# Patient Record
Sex: MALE | Race: WHITE | NOT HISPANIC OR LATINO | Employment: FULL TIME | URBAN - NONMETROPOLITAN AREA
[De-identification: names, ages, dates, MRNs, and addresses within clinical notes are randomized per-mention and may not be internally consistent; named-entity substitution may affect disease eponyms.]

---

## 2022-12-17 ENCOUNTER — HOSPITAL ENCOUNTER (EMERGENCY)
Facility: HOSPITAL | Age: 47
Discharge: HOME | End: 2022-12-17
Attending: EMERGENCY MEDICINE

## 2022-12-17 ENCOUNTER — APPOINTMENT (EMERGENCY)
Dept: CT IMAGING | Facility: HOSPITAL | Age: 47
End: 2022-12-17

## 2022-12-17 ENCOUNTER — APPOINTMENT (EMERGENCY)
Dept: RADIOLOGY | Facility: HOSPITAL | Age: 47
End: 2022-12-17

## 2022-12-17 VITALS
OXYGEN SATURATION: 95 % | TEMPERATURE: 98.4 F | RESPIRATION RATE: 18 BRPM | SYSTOLIC BLOOD PRESSURE: 151 MMHG | BODY MASS INDEX: 35.21 KG/M2 | HEIGHT: 72 IN | DIASTOLIC BLOOD PRESSURE: 99 MMHG | HEART RATE: 96 BPM | WEIGHT: 260 LBS

## 2022-12-17 DIAGNOSIS — R06.02 SOB (SHORTNESS OF BREATH): Primary | ICD-10-CM

## 2022-12-17 DIAGNOSIS — U09.9 POST-COVID-19 SYNDROME: ICD-10-CM

## 2022-12-17 LAB
2HR DELTA HS TROPONIN: -1 NG/L
ALBUMIN SERPL BCP-MCNC: 3.7 G/DL (ref 3.5–5)
ALP SERPL-CCNC: 66 U/L (ref 46–116)
ALT SERPL W P-5'-P-CCNC: 26 U/L (ref 12–78)
ANION GAP SERPL CALCULATED.3IONS-SCNC: 9 MMOL/L (ref 4–13)
AST SERPL W P-5'-P-CCNC: 13 U/L (ref 5–45)
BASOPHILS # BLD AUTO: 0.02 THOUSANDS/ÂΜL (ref 0–0.1)
BASOPHILS NFR BLD AUTO: 0 % (ref 0–1)
BILIRUB SERPL-MCNC: 0.52 MG/DL (ref 0.2–1)
BUN SERPL-MCNC: 13 MG/DL (ref 5–25)
CALCIUM SERPL-MCNC: 8.8 MG/DL (ref 8.3–10.1)
CARDIAC TROPONIN I PNL SERPL HS: 22 NG/L
CARDIAC TROPONIN I PNL SERPL HS: 23 NG/L
CHLORIDE SERPL-SCNC: 105 MMOL/L (ref 96–108)
CK MB SERPL-MCNC: 1.6 % (ref 0–2.5)
CK MB SERPL-MCNC: 2.6 NG/ML (ref 0–5)
CK SERPL-CCNC: 162 U/L (ref 39–308)
CO2 SERPL-SCNC: 27 MMOL/L (ref 21–32)
CREAT SERPL-MCNC: 1.01 MG/DL (ref 0.6–1.3)
CRP SERPL QL: 9.5 MG/L
D DIMER PPP FEU-MCNC: 1.97 UG/ML FEU
EOSINOPHIL # BLD AUTO: 0.07 THOUSAND/ÂΜL (ref 0–0.61)
EOSINOPHIL NFR BLD AUTO: 1 % (ref 0–6)
ERYTHROCYTE [DISTWIDTH] IN BLOOD BY AUTOMATED COUNT: 13.5 % (ref 11.6–15.1)
FLUAV RNA RESP QL NAA+PROBE: NEGATIVE
FLUBV RNA RESP QL NAA+PROBE: NEGATIVE
GFR SERPL CREATININE-BSD FRML MDRD: 88 ML/MIN/1.73SQ M
GLUCOSE SERPL-MCNC: 113 MG/DL (ref 65–140)
HCT VFR BLD AUTO: 44.6 % (ref 36.5–49.3)
HGB BLD-MCNC: 15.1 G/DL (ref 12–17)
IMM GRANULOCYTES # BLD AUTO: 0.07 THOUSAND/UL (ref 0–0.2)
IMM GRANULOCYTES NFR BLD AUTO: 1 % (ref 0–2)
INR PPP: 0.97 (ref 0.84–1.19)
LYMPHOCYTES # BLD AUTO: 2.11 THOUSANDS/ÂΜL (ref 0.6–4.47)
LYMPHOCYTES NFR BLD AUTO: 20 % (ref 14–44)
MAGNESIUM SERPL-MCNC: 2.2 MG/DL (ref 1.6–2.6)
MCH RBC QN AUTO: 29.2 PG (ref 26.8–34.3)
MCHC RBC AUTO-ENTMCNC: 33.9 G/DL (ref 31.4–37.4)
MCV RBC AUTO: 86 FL (ref 82–98)
MONOCYTES # BLD AUTO: 0.8 THOUSAND/ÂΜL (ref 0.17–1.22)
MONOCYTES NFR BLD AUTO: 7 % (ref 4–12)
NEUTROPHILS # BLD AUTO: 7.67 THOUSANDS/ÂΜL (ref 1.85–7.62)
NEUTS SEG NFR BLD AUTO: 71 % (ref 43–75)
NRBC BLD AUTO-RTO: 0 /100 WBCS
NT-PROBNP SERPL-MCNC: 864 PG/ML
PLATELET # BLD AUTO: 224 THOUSANDS/UL (ref 149–390)
PMV BLD AUTO: 11.8 FL (ref 8.9–12.7)
POTASSIUM SERPL-SCNC: 3.3 MMOL/L (ref 3.5–5.3)
PROT SERPL-MCNC: 6.6 G/DL (ref 6.4–8.4)
PROTHROMBIN TIME: 13 SECONDS (ref 11.6–14.5)
RBC # BLD AUTO: 5.18 MILLION/UL (ref 3.88–5.62)
RSV RNA RESP QL NAA+PROBE: NEGATIVE
SARS-COV-2 RNA RESP QL NAA+PROBE: NEGATIVE
SODIUM SERPL-SCNC: 141 MMOL/L (ref 135–147)
WBC # BLD AUTO: 10.74 THOUSAND/UL (ref 4.31–10.16)

## 2022-12-17 RX ORDER — ALLOPURINOL 100 MG/1
100 TABLET ORAL DAILY
COMMUNITY

## 2022-12-17 RX ADMIN — IOHEXOL 98 ML: 350 INJECTION, SOLUTION INTRAVENOUS at 19:19

## 2022-12-17 NOTE — ED PROVIDER NOTES
History  Chief Complaint   Patient presents with   • Shortness of Breath     Sent in by  to rule out pneumonia  Pt had episode of spitting up blood     Patient is a 60-year-old male presenting to the emergency department complaining of cough, congestion, chest tightness and shortness of breath, symptoms been going on for the past few days, he did test positive for COVID-19 on November 29, he took OTC medications for symptom control, he is a former smoker having quit 3 months ago, he continues to have a productive cough, he gets some dyspnea on exertion as well, this is the first episode of COVID that patient has had that he is aware of, he has not been vaccinated for COVID previously          Prior to Admission Medications   Prescriptions Last Dose Informant Patient Reported? Taking?   allopurinol (ZYLOPRIM) 100 mg tablet   Yes Yes   Sig: Take 100 mg by mouth daily   metFORMIN (GLUCOPHAGE) 1000 MG tablet   Yes Yes   Sig: Take 1,000 mg by mouth 2 (two) times a day with meals      Facility-Administered Medications: None       Past Medical History:   Diagnosis Date   • [facility]     boardline       History reviewed  No pertinent surgical history  History reviewed  No pertinent family history  I have reviewed and agree with the history as documented  E-Cigarette/Vaping   • E-Cigarette Use Never User      E-Cigarette/Vaping Substances     Social History     Tobacco Use   • Smoking status: Former     Types: Cigarettes   • Smokeless tobacco: Former   Vaping Use   • Vaping Use: Never used   Substance Use Topics   • Drug use: Not Currently       Review of Systems   Constitutional: Negative  HENT: Positive for congestion  Eyes: Negative  Respiratory: Positive for cough, chest tightness and shortness of breath  Cardiovascular: Negative  Gastrointestinal: Negative  Endocrine: Negative  Genitourinary: Negative  Musculoskeletal: Negative  Skin: Negative      Allergic/Immunologic: Negative  Neurological: Negative  Hematological: Negative  Psychiatric/Behavioral: Negative  Physical Exam  Physical Exam  Constitutional:       Appearance: He is well-developed  HENT:      Head: Normocephalic and atraumatic  Mouth/Throat:      Mouth: Mucous membranes are moist    Eyes:      Conjunctiva/sclera: Conjunctivae normal       Pupils: Pupils are equal, round, and reactive to light  Cardiovascular:      Rate and Rhythm: Tachycardia present  Pulmonary:      Effort: Pulmonary effort is normal       Breath sounds: Normal breath sounds  Abdominal:      Palpations: Abdomen is soft  Musculoskeletal:         General: Normal range of motion  Cervical back: Normal range of motion and neck supple  Skin:     General: Skin is warm and dry  Neurological:      Mental Status: He is alert and oriented to person, place, and time           Vital Signs  ED Triage Vitals   Temperature Pulse Respirations Blood Pressure SpO2   12/17/22 1656 12/17/22 1656 12/17/22 1656 12/17/22 1656 12/17/22 1656   (!) 96 6 °F (35 9 °C) (!) 116 16 (!) 194/130 95 %      Temp Source Heart Rate Source Patient Position - Orthostatic VS BP Location FiO2 (%)   12/17/22 1900 12/17/22 1900 -- 12/17/22 1656 --   Oral Monitor  Left arm       Pain Score       12/17/22 1656       No Pain           Vitals:    12/17/22 1656 12/17/22 1900   BP: (!) 194/130 (!) 163/102   Pulse: (!) 116 91         Visual Acuity      ED Medications  Medications   iohexol (OMNIPAQUE) 350 MG/ML injection (SINGLE-DOSE) 98 mL (98 mL Intravenous Given 12/17/22 1919)       Diagnostic Studies  Results Reviewed     Procedure Component Value Units Date/Time    HS Troponin I 2hr [[id number]]  (Normal) Collected: 12/17/22 1854    Lab Status: Final result Specimen: Blood from Arm, Left Updated: 12/17/22 1950     hs TnI 2hr 22 ng/L      Delta 2hr hsTnI -1 ng/L     HS Troponin I 4hr [[id number]]     Lab Status: No result Specimen: Blood     FLU/RSV/COVID - if FLU/RSV clinically relevant [[id number]]  (Normal) Collected: 12/17/22 1732    Lab Status: Final result Specimen: Nares from Nasopharyngeal Swab Updated: 12/17/22 1832     SARS-CoV-2 Negative     INFLUENZA A PCR Negative     INFLUENZA B PCR Negative     RSV PCR Negative    Narrative:      FOR PEDIATRIC PATIENTS - copy/paste COVID Guidelines URL to browser: [email or web address]    SARS-CoV-2 assay is a Nucleic Acid Amplification assay intended for the  qualitative detection of nucleic acid from SARS-CoV-2 in nasopharyngeal  swabs  Results are for the presumptive identification of SARS-CoV-2 RNA  Positive results are indicative of infection with SARS-CoV-2, the virus  causing COVID-19, but do not rule out bacterial infection or co-infection  with other viruses  Laboratories within the [place] and its  territories are required to report all positive results to the appropriate  public health authorities  Negative results do not preclude SARS-CoV-2  infection and should not be used as the sole basis for treatment or other  patient management decisions  Negative results must be combined with  clinical observations, patient history, and epidemiological information  This test has not been FDA cleared or approved  This test has been authorized by FDA under an Emergency Use Authorization  (EUA)  This test is only authorized for the duration of time the  declaration that circumstances exist justifying the authorization of the  emergency use of an in vitro diagnostic tests for detection of SARS-CoV-2  virus and/or diagnosis of COVID-19 infection under section 564(b)(1) of  the Act, 21 U  [place] C  541PKW-3(M)(7), unless the authorization is terminated  or revoked sooner  The test has been validated but independent review by FDA  and CLIA is pending  Test performed using [employer]: This RT-PCR assay targets N2,  a region unique to SARS-CoV-2   A conserved region in the E-gene was chosen  for pan-Sarbecovirus detection which includes SARS-CoV-2  According to CMS-2020-01-R, this platform meets the definition of high-throughput technology      Comprehensive metabolic panel [[id number]]  (Abnormal) Collected: 12/17/22 1732    Lab Status: Final result Specimen: Blood from Arm, Right Updated: 12/17/22 1823     Sodium 141 mmol/L      Potassium 3 3 mmol/L      Chloride 105 mmol/L      CO2 27 mmol/L      ANION GAP 9 mmol/L      BUN 13 mg/dL      Creatinine 1 01 mg/dL      Glucose 113 mg/dL      Calcium 8 8 mg/dL      AST 13 U/L      ALT 26 U/L      Alkaline Phosphatase 66 U/L      Total Protein 6 6 g/dL      Albumin 3 7 g/dL      Total Bilirubin 0 52 mg/dL      eGFR 88 ml/min/1 73sq m     Narrative:      [place] guidelines for Chronic Kidney Disease (CKD):   •  Stage 1 with normal or high GFR (GFR > 90 mL/min/1 73 square meters)  •  Stage 2 Mild CKD (GFR = 60-89 mL/min/1 73 square meters)  •  Stage 3A Moderate CKD (GFR = 45-59 mL/min/1 73 square meters)  •  Stage 3B Moderate CKD (GFR = 30-44 mL/min/1 73 square meters)  •  Stage 4 Severe CKD (GFR = 15-29 mL/min/1 73 square meters)  •  Stage 5 End Stage CKD (GFR <15 mL/min/1 73 square meters)  Note: GFR calculation is accurate only with a steady state creatinine    Magnesium [[id number]]  (Normal) Collected: 12/17/22 1732    Lab Status: Final result Specimen: Blood from Arm, Right Updated: 12/17/22 1823     Magnesium 2 2 mg/dL     C-reactive protein [[id number]]  (Abnormal) Collected: 12/17/22 1732    Lab Status: Final result Specimen: Blood from Arm, Right Updated: 12/17/22 1823     CRP 9 5 mg/L     NT-BNP PRO [[id number]]  (Abnormal) Collected: 12/17/22 1732    Lab Status: Final result Specimen: Blood from Arm, Right Updated: 12/17/22 1823     NT-proBNP 864 pg/mL     D-dimer, quantitative [[id number]]  (Abnormal) Collected: 12/17/22 1732    Lab Status: Final result Specimen: Blood from Arm, Right Updated: 12/17/22 1822     D-Dimer, Quant 1 97 ug/ml FEU     CK (with reflex to MB) [[id number]]  (Normal) Collected: 12/17/22 1732    Lab Status: Final result Specimen: Blood from Arm, Right Updated: 12/17/22 1821     Total  U/L     CKMB [[id number]] Collected: 12/17/22 1732    Lab Status: In process Specimen: Blood from Arm, Right Updated: 12/17/22 1821    HS Troponin 0hr (reflex protocol) [[id number]]  (Normal) Collected: 12/17/22 1732    Lab Status: Final result Specimen: Blood from Arm, Right Updated: 12/17/22 1820     hs TnI 0hr 23 ng/L     Protime-INR [[id number]]  (Normal) Collected: 12/17/22 1732    Lab Status: Final result Specimen: Blood from Arm, Right Updated: 12/17/22 1807     Protime 13 0 seconds      INR 0 97    CBC and differential [[id number]]  (Abnormal) Collected: 12/17/22 1732    Lab Status: Final result Specimen: Blood from Arm, Right Updated: 12/17/22 1753     WBC 10 74 Thousand/uL      RBC 5 18 Million/uL      Hemoglobin 15 1 g/dL      Hematocrit 44 6 %      MCV 86 fL      MCH 29 2 pg      MCHC 33 9 g/dL      RDW 13 5 %      MPV 11 8 fL      Platelets 005 Thousands/uL      nRBC 0 /100 WBCs      Neutrophils Relative 71 %      Immat GRANS % 1 %      Lymphocytes Relative 20 %      Monocytes Relative 7 %      Eosinophils Relative 1 %      Basophils Relative 0 %      Neutrophils Absolute 7 67 Thousands/µL      Immature Grans Absolute 0 07 Thousand/uL      Lymphocytes Absolute 2 11 Thousands/µL      Monocytes Absolute 0 80 Thousand/µL      Eosinophils Absolute 0 07 Thousand/µL      Basophils Absolute 0 02 Thousands/µL                  CTA ED chest PE study   Final Result by Marisel Duenas MD (12/17 1954)      No pulmonary embolus is seen  Trace bilateral pleural effusions versus pleural thickening  Mild bibasilar subsegmental atelectasis  No rudy pneumonia identified  Incidental 3 mm left renal calculus                    Workstation performed: [id number]         XR chest 1 view portable    (Results Pending)              Procedures  ECG 12 Lead Documentation Only    Date/Time: 12/17/2022 5:21 PM  Performed by: Katt Sánchez DO  Authorized by: Katt Sánchez DO     Indications / Diagnosis:  Shortness of breath  ECG reviewed by me, the ED Provider: yes    Patient location:  ED  Previous ECG:     Previous ECG:  Unavailable    Comparison to cardiac monitor: Yes    Interpretation:     Interpretation: abnormal    Rate:     ECG rate:  102    ECG rate assessment: tachycardic    Rhythm:     Rhythm: sinus tachycardia    Ectopy:     Ectopy: none    QRS:     QRS intervals:  Normal  Conduction:     Conduction: normal    ST segments:     ST segments:  Non-specific    Elevation:  V2, V3 and V4  T waves:     T waves: normal               ED Course                               SBIRT 22yo+    Flowsheet Row Most Recent Value   SBIRT (23 yo +)    In order to provide better care to our patients, we are screening all of our patients for alcohol and drug use  Would it be okay to ask you these screening questions? Yes Filed at: 12/17/2022 1908   Initial Alcohol Screen: US AUDIT-C     1  How often do you have a drink containing alcohol? 0 Filed at: 12/17/2022 1908   2  How many drinks containing alcohol do you have on a typical day you are drinking? 0 Filed at: 12/17/2022 1908   3a  Male UNDER 65: How often do you have five or more drinks on one occasion? 0 Filed at: 12/17/2022 1908   3b  FEMALE Any Age, or MALE 65+: How often do you have 4 or more drinks on one occassion? 0 Filed at: 12/17/2022 1908   Audit-C Score 0 Filed at: 12/17/2022 0410   GEOFFREY: How many times in the past year have you    Used an illegal drug or used a prescription medication for non-medical reasons?  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  SOB (shortness of breath): new and requires workup  Diagnosis management comments: Lab and imaging findings were discussed with patient at bedside, symptoms consistent with post COVID syndrome, advise close follow-up with PCP or return if symptoms worsen, patient acknowledges understanding and agreement with this plan       Amount and/or Complexity of Data Reviewed  Clinical lab tests: ordered and reviewed  Tests in the radiology section of CPT®: ordered and reviewed  Independent visualization of images, tracings, or specimens: yes    Risk of Complications, Morbidity, and/or Mortality  Presenting problems: moderate  Diagnostic procedures: moderate  Management options: moderate    Patient Progress  Patient progress: stable      Disposition  Final diagnoses:   SOB (shortness of breath)   Post-COVID-19 syndrome     Time reflects when diagnosis was documented in both MDM as applicable and the Disposition within this note     Time User Action Codes Description Comment    12/17/2022  8:09 PM Quang Tristan Add [R06 02] SOB (shortness of breath)     12/17/2022  8:09 PM Quang Tristan Add [U09 9] Post-COVID-19 syndrome       ED Disposition     ED Disposition   Discharge    Condition   Stable    Date/Time   Sat Dec 17, 2022  8:09 PM    Comment   Leisa Garza discharge to home/self care  Follow-up Information     Follow up With Specialties Details Why [facility] In [facility]  [address] [facility] [place], [place]  [address]  [place]  [address]  [id number]  [phone]            Patient's Medications   Discharge Prescriptions    No medications on file       No discharge procedures on file      PDMP Review     None          ED Provider  Electronically Signed by           Meghan Echavarria DO  12/17/22 2013

## 2022-12-17 NOTE — Clinical Note
Jair Vasquez was seen and treated in our emergency department on 12/17/2022  Diagnosis:     Lety Miller  may return to work on return date  He may return on this date: If you have any questions or concerns, please don't hesitate to call        Ajit Wolfe, DO    ______________________________           _______________          _______________  Hospital Representative                              Date                                Time

## 2022-12-18 LAB
ATRIAL RATE: 102 BPM
ATRIAL RATE: 102 BPM
P AXIS: 25 DEGREES
P AXIS: 25 DEGREES
PR INTERVAL: 142 MS
PR INTERVAL: 142 MS
QRS AXIS: 240 DEGREES
QRS AXIS: 240 DEGREES
QRSD INTERVAL: 90 MS
QRSD INTERVAL: 90 MS
QT INTERVAL: 342 MS
QT INTERVAL: 342 MS
QTC INTERVAL: 445 MS
QTC INTERVAL: 445 MS
T WAVE AXIS: 51 DEGREES
T WAVE AXIS: 51 DEGREES
VENTRICULAR RATE: 102 BPM
VENTRICULAR RATE: 102 BPM

## 2023-04-06 ENCOUNTER — APPOINTMENT (OUTPATIENT)
Age: 48
End: 2023-04-06

## 2023-04-06 DIAGNOSIS — R06.02 SHORTNESS OF BREATH: ICD-10-CM

## 2023-04-06 DIAGNOSIS — U09.9 POST-COVID SYNDROME: Primary | ICD-10-CM

## 2023-04-06 DIAGNOSIS — U09.9 POST-COVID SYNDROME: ICD-10-CM
